# Patient Record
(demographics unavailable — no encounter records)

---

## 2024-11-15 NOTE — HISTORY OF PRESENT ILLNESS
[FreeTextEntry1] : Patient feels well, he is asymptomatic. He has gained weight. He is exercising regularly and following the diet. His blood sugars at home are well controlled, they are usually around 100 mg/dl. He denies low blood glucose during the night. He denies chest pain, or SOB. Denies numbness, tingling or burning sensation on his extremities. Taking his medications regularly. He has seen the Ophthalmologist recently. He has seen the Podiatrist recently. He has not seen the Cardiologist recently.

## 2024-11-15 NOTE — ASSESSMENT
[FreeTextEntry1] : Patient is doing well His weight has increased slightly The diabetes is well controlled No Diabetic Retinopathy or Nephropathy present at this time The lipid panel was fine Will continue the same treatment His BP was also fine Advised to follow the diet and exercise regularly

## 2024-11-15 NOTE — DATA REVIEWED
[FreeTextEntry1] : The FBS and hbA1c have improved. The renal function is fine but he has mild microalbuminuria

## 2025-02-28 NOTE — DATA REVIEWED
[FreeTextEntry1] : The HbA1c was 7.9% and it has deteriorated slightly. The renal function is fine. His renal function was fine. No microalbuminuria. His TSH and free t4 were fine.

## 2025-02-28 NOTE — ASSESSMENT
[FreeTextEntry1] : Patient feels well The diabetic control has deteriorated He has gained a little weight He is clinically euthyroid The TSH and Free t4 were normal

## 2025-02-28 NOTE — REASON FOR VISIT
[DM Type 1] : DM Type 1 [Hypothyroidism] : hypothyroidism [Thyroid nodule/ MNG] : thyroid nodule/ MNG

## 2025-02-28 NOTE — HISTORY OF PRESENT ILLNESS
[FreeTextEntry1] : Patient feels well, he is asymptomatic. He has gained weight. He is walking regularly. He is following the diet. His fasting blood sugars at home are not/well controlled, they are usually around 100 mg/dl. He denies low blood glucose during the night. He denies chest pain, or SOB. Denies numbness, tingling or burning sensation on his extremities. Taking his medications regularly. He has not/seen the Ophthalmologist recently. He has not/seen the Podiatrist recently. He has not/seen the Cardiologist recently.

## 2025-05-27 NOTE — ASSESSMENT
[FreeTextEntry1] : Patient is doing well He is losing weight The diabetes is well controlled The lipid panel was fine Will continue the same treatment His BP was within normal limits Advised to take her medications regularly Advised to follow the diet and exercise regularly The thyroid tests were normal

## 2025-05-27 NOTE — HISTORY OF PRESENT ILLNESS
[FreeTextEntry1] : Patient feels well, he is asymptomatic. He has lost weight. He is walking regularly. He is following the diet. His fasting blood sugars at home are not/well controlled, they are usually around 100 mg/dl. He denies low blood glucose during the night. He denies chest pain, or SOB. Denies numbness, tingling or burning sensation on his extremities. Taking his medications regularly. He has seen the Ophthalmologist recently. He has not seen the Podiatrist recently. He has not seen the Cardiologist recently.

## 2025-05-27 NOTE — DATA REVIEWED
[FreeTextEntry1] : The FBS and HbA1c indicates adequate diabetic control. The lipid panel is fine. The thyroid tests were also normal.